# Patient Record
Sex: MALE | Race: OTHER | HISPANIC OR LATINO | Employment: FULL TIME | URBAN - METROPOLITAN AREA
[De-identification: names, ages, dates, MRNs, and addresses within clinical notes are randomized per-mention and may not be internally consistent; named-entity substitution may affect disease eponyms.]

---

## 2018-06-28 ENCOUNTER — HOSPITAL ENCOUNTER (EMERGENCY)
Facility: HOSPITAL | Age: 23
Discharge: HOME | End: 2018-06-29
Attending: EMERGENCY MEDICINE
Payer: COMMERCIAL

## 2018-06-28 DIAGNOSIS — R10.9 ABDOMINAL PAIN: ICD-10-CM

## 2018-06-28 DIAGNOSIS — R05.9 COUGH: Primary | ICD-10-CM

## 2018-06-29 ENCOUNTER — APPOINTMENT (EMERGENCY)
Dept: RADIOLOGY | Facility: HOSPITAL | Age: 23
End: 2018-06-29
Payer: COMMERCIAL

## 2018-06-29 VITALS
HEART RATE: 61 BPM | SYSTOLIC BLOOD PRESSURE: 127 MMHG | TEMPERATURE: 97.1 F | DIASTOLIC BLOOD PRESSURE: 68 MMHG | OXYGEN SATURATION: 97 % | RESPIRATION RATE: 20 BRPM

## 2018-06-29 PROCEDURE — 96372 THER/PROPH/DIAG INJ SC/IM: CPT

## 2018-06-29 PROCEDURE — 71046 X-RAY EXAM CHEST 2 VIEWS: CPT

## 2018-06-29 PROCEDURE — 99283 EMERGENCY DEPT VISIT LOW MDM: CPT

## 2018-06-29 RX ORDER — KETOROLAC TROMETHAMINE 30 MG/ML
30 INJECTION, SOLUTION INTRAMUSCULAR; INTRAVENOUS ONCE
Status: COMPLETED | OUTPATIENT
Start: 2018-06-29 | End: 2018-06-29

## 2018-06-29 RX ORDER — KETOROLAC TROMETHAMINE 30 MG/ML
INJECTION, SOLUTION INTRAMUSCULAR; INTRAVENOUS
Status: COMPLETED
Start: 2018-06-29 | End: 2018-06-29

## 2018-06-29 RX ORDER — NAPROXEN 375 MG/1
375 TABLET ORAL 2 TIMES DAILY WITH MEALS
Qty: 20 TABLET | Refills: 0 | Status: SHIPPED | OUTPATIENT
Start: 2018-06-29

## 2018-06-29 RX ADMIN — KETOROLAC TROMETHAMINE 30 MG: 30 INJECTION, SOLUTION INTRAMUSCULAR; INTRAVENOUS at 01:08

## 2018-06-29 NOTE — ED NOTES
Pt  Reports that his pain is better - asking for work note - as he was supposed to go to work Wilmer de jesus, RN  06/29/18 4142

## 2018-06-29 NOTE — ED PROVIDER NOTES
Pt Name: Renny Pope  MRN: [id number]  [place] 1995  Age/Sex: 23 y o  male  Date of evaluation: 6/28/2018  PCP: Idalia Whaley MD    [address] [place] [address]    Chief Complaint   Patient presents with   Veleta Jarocho Like Symptoms     pt states that he started on sunday with congestion and cough  pt took OTC meds this morning  pt states that it is a dry cough  pt denies fevers but states he has chills  pt denies having a FD  pt states that he is also is having generalized pain  pt states that he also has a sore throat         HPI    23 y o  male presenting with cough congestion, and chills for approximately 5 days  Patient states that he is also having pain throughout his entire body, and entire abdomen back  This pain is mild, unchanged with over-the-counter cold remedies that he has been taking  Patient states he still able tolerate food and drink  He notes these also started developing a mild sore throat today  He denies trauma, sick contacts, recent travel  HPI      Past Medical and Surgical History    History reviewed  No pertinent past medical history  Past Surgical History:   Procedure Laterality Date    APPENDECTOMY         History reviewed  No pertinent family history  Social History   Substance Use Topics    Smoking status: Current Every Day Smoker     Packs/day: 1 00     Types: Cigarettes    Smokeless tobacco: Current User    Alcohol use Yes      Comment: social           Allergies    No Known Allergies    Home Medications    Prior to Admission medications    Not on File           Review of Systems    Review of Systems   Constitutional: Positive for chills and fatigue  Negative for appetite change and diaphoresis  HENT: Positive for sore throat  Negative for drooling, facial swelling, trouble swallowing and voice change  Respiratory: Positive for cough  Negative for apnea, shortness of breath and wheezing  Cardiovascular: Negative for chest pain and leg swelling  Gastrointestinal: Negative for abdominal distention, abdominal pain, diarrhea, nausea and vomiting  Genitourinary: Negative for dysuria and urgency  Musculoskeletal: Positive for back pain and myalgias  Negative for arthralgias, gait problem and neck pain  Skin: Negative for color change, rash and wound  Neurological: Negative for seizures, speech difficulty, weakness and headaches  Psychiatric/Behavioral: Negative for agitation, behavioral problems and dysphoric mood  The patient is not nervous/anxious  All other systems reviewed and negative  Physical Exam      ED Triage Vitals [06/28/18 2343]   Temperature Pulse Respirations Blood Pressure SpO2   (!) 97 1 °F (36 2 °C) 98 18 132/77 99 %      Temp Source Heart Rate Source Patient Position - Orthostatic VS BP Location FiO2 (%)   Temporal Monitor Sitting Left arm --      Pain Score       No Pain               Physical Exam   Constitutional: He is oriented to person, place, and time  He appears well-developed and well-nourished  HENT:   Head: Normocephalic and atraumatic  Posterior oropharynx erythematous but no swelling, airway widely patent and handling secretions  Eyes: Conjunctivae and EOM are normal  Pupils are equal, round, and reactive to light  Neck: Normal range of motion  Neck supple  No tracheal deviation present  Cardiovascular: Normal rate, regular rhythm, normal heart sounds and intact distal pulses  No murmur heard  Pulmonary/Chest: Effort normal and breath sounds normal  No stridor  No respiratory distress  He has no wheezes  He has no rales  Abdominal: Soft  He exhibits no distension  There is tenderness  There is no rebound and no guarding  Diffuse mild ttp  Musculoskeletal: Normal range of motion  He exhibits no edema or deformity  Neurological: He is alert and oriented to person, place, and time  Skin: Skin is warm and dry  No rash noted  Psychiatric: He has a normal mood and affect   His behavior is normal  Judgment and thought content normal             Diagnostic Results      Labs:    No results found for this or any previous visit  All labs reviewed and utilized in the medical decision making process    Radiology:    XR chest 2 views   ED Interpretation   No acute cardiopulmonary      Final Result      No acute cardiopulmonary disease  Workstation performed: [id number]             All radiology studies independently viewed by me and interpreted by the radiologist     Procedure    Procedures    CritCare Time      ED Course of Care and Re-Assessments    Symptoms improved significantly with ketorolac  Medications   ketorolac (TORADOL) injection 30 mg (30 mg Intramuscular Given 6/29/18 0108)           FINAL IMPRESSION    Final diagnoses:   Cough   Abdominal pain         DISPOSITION/PLAN    53-year-old male with history and symptoms above  Vital signs reassuring, examination also reassuring  Chest x-ray sent returned reassuring with no acute disease seen  Do not suspect bacterial pneumonia, sepsis, pneumothorax, ACS, other life threat at this time  Symptoms much improved with Toradol  Discharged strict return precautions, follow up primary care doctor  Time reflects when diagnosis was documented in both MDM as applicable and the Disposition within this note     Time User Action Codes Description Comment    6/29/2018  1:14 AM Florance Mirzas T Add [R05] Cough     6/29/2018  1:14 AM Minus Boys Add [R10 9] Abdominal pain       ED Disposition     ED Disposition Condition Comment    Discharge  [address] discharge to home/self care      Condition at discharge: Good        Follow-up Information     Follow up With Specialties Details Why Contact Info    Anthony Lugo MD Internal Medicine Call in 1 day As needed to schedule an appointment to recheck your symptoms [address],  [address] [phone]              PATIENT REFERRED TO:    Anthony Lugo MD  [address] [place] [id number]  [phone]    Call in 1 day  As needed to schedule an appointment to recheck your symptoms      DISCHARGE MEDICATIONS:    Discharge Medication List as of 6/29/2018  1:16 AM      START taking these medications    Details   naproxen (NAPROSYN) 375 mg tablet Take 1 tablet (375 mg total) by mouth 2 (two) times a day with meals, Starting Fri 6/29/2018, Print             No discharge procedures on file           MD Bard Mg Leggett MD  06/29/18 6867

## 2018-06-29 NOTE — DISCHARGE INSTRUCTIONS
Dolor abdominal, cuidados ambulatorios   INFORMACIÓN GENERAL:   El dolor abdominal  puede ser sordo, molesto o [place]  Puede sentir dolor en kt meño del abdomen o en todo el abdomen  El dolor puede deberse a ciertos estados ori estreñimiento, sensibilidad o intoxicación alimentaria, infección o kt obstrucción  Asimismo, el dolor abdominal puede deberse a kt hernia, apendicitis o úlcera  La causa del dolor abdominal puede ser desconocida  Busque cuidados inmediatos para los siguientes síntomas:   · [place] dolor de pecho o falta de aliento    · Dolor pulsátil en el abdomen superior o en la parte inferior de la espalda que de repente es donna    · Dolor que se localiza en el abdomen inferior derecho y empeora con el movimiento    · Fiebre por encima de 100 4°F (38°C) o escalofríos janell    · Vómito de todo lo que usted come y allen    · Dolor que no mejora o más summer empeora davie las siguientes 8 a 12 horas    · Marcelo en el vómito o heces que se azeb negras y alquitranadas    · La piel o el stuart de los ojos se vuelven amarillentos    · Grandes cantidades de sangrado vaginal que no es pablo periodo menstrual  El tratamiento para el dolor abdominal  puede llegar a incluir medicamentos para calmar pablo estómago, prevenir el vómito o disminuir el dolor  Programe kt laura con pablo proveedor de [employer] se le haya indicado: Anote michelle preguntas para que se acuerde de hacerlas davie michelle visitas  ACUERDOS SOBRE PABLO CUIDADO:   Usted tiene el derecho de participar en la planificación de pablo cuidado  Aprenda todo lo que pueda sobre pablo condición y ori darle tratamiento  Discuta con michelle médicos michelle opciones de tratamiento para juntos decidir el cuidado que usted quiere recibir  Usted siempre tiene el derecho a rechazar pablo tratamiento  Esta información es sólo para uso en educación  Pablo intención no es darle un consejo médico sobre enfermedades o tratamientos   Colsulte con pablo [place] farmacéutico antes de seguir cualquier régimen médico para saber si es seguro y efectivo para usted  © 2014 [address] is for End User's use only and may not be sold, redistributed or otherwise used for commercial purposes  All illustrations and images included in CareNotes® are the copyrighted property of A D A M , Inc  or Maico Vann  Tos aguda   LO QUE NECESITA SABER:   Iraida Stagers tos aguda puede durar hasta 3 semanas  Las causas comunes de kt tos aguda incluyen un resfriado, alergias o kt infección pulmonar  INSTRUCCIONES SOBRE EL JOSE HOSPITALARIA:   Regrese a la benji de emergencias si:   · Tiene dificultad para respirar o le falta el aire  · Usted tose lachelle o nota lachelle en dobbs mucosidad  · Se desmaya, se siente débil o mareado  · Le duele el pecho cuando respira hondo o tose  · Tiene respiración silbante  Pregúntele a dobbs Heather Punch vitaminas y minerales son adecuados para usted  · Usted tiene fiebre  · La tos dura más de 4 semanas  · Judy síntomas no mejoran con el tratamiento  · Usted tiene preguntas o inquietudes acerca de dobbs condición o cuidado  Medicamentos:   · Medicamentos,  para detener la tos, disminuir la inflamación de las vías respiratorias o ayudar a abrirlas  Los medicamentos también pueden despejar las vías respiratorias  Pregunte a dobbs médico qué medicamentos de venta patricia puede maribel  Si tiene kt infección causada por bacterias, es posible que necesite antibióticos  · [place] judy medicamentos ori se le haya indicado  Consulte con dobbs médico si usted anusha que dobbs medicamento no le está ayudando o si presenta efectos secundarios  Infórmele si es alérgico a cualquier medicamento  Mantenga kt lista actualizada de los [place], las vitaminas y los productos herbales que allen  Incluya los siguientes datos de los medicamentos: cantidad, frecuencia y motivo de administración   Traiga con usted la lista o los envases de la píldoras a judy citas de seguimiento  Lleve la lista de los medicamentos con usted en jose de kt emergencia  El manejo de dobbs síntomas:   · No fume y permanezca lejos de otras personas que fuman  La nicotina y otros químicos contenidos en los cigarrillos y puros pueden causar daño pulmonar y empeorar dobbs tos  Pida información a dobbs médico si usted actualmente fuma y necesita ayuda para dejar de fumar  Los cigarrillos electrónicos o tabaco sin humo todavía contienen nicotina  Consulte con dobbs médico antes de [employer]  · Stella líquidos adicionales ori se le indique  Los líquidos le ayudarán a aflojar y disolver la mucosidad para que la pueda expectorar  Los líquidos ayudarán a evitar la deshidratación  Los mejores líquidos para maribel son el agua, el jugo y el caldo  No tome líquidos que contienen cafeína  La cafeína puede aumentar el riesgo de deshidratación  Pregúntele a dobbs médico cuál es la cantidad de líquido que necesita ingerir a diario  · Repose según le indicaron  No realice actividades que empeoren la tos, ori el ejercicio físico      · Use un humidificador o vaporizador  Use un humidificador de devang frío o un vaporizador para elevar la humedad en dobbs casa  [employer] podría ayudarle a respirar más fácilmente y al mismo tiempo disminuir la tos  · Ingiera de 2 a 5 ml de [employer] al día  La miel puede ayudar a diluir los mocos y [employer] tos  · Utilice gotas o pastillas para la tos  Estas pueden ayudar a disminuir la irritación de la garganta y la tos  Acuda a michelle consultas de control con dobbs médico según le indicaron  Anote michelle preguntas para que se acuerde de hacerlas davie michelle visitas  © 2017 [address] is for End User's use only and may not be sold, redistributed or otherwise used for commercial purposes  All illustrations and images included in CareNotes® are the copyrighted property of A D A M , Inc  or Maico Vann    Esta información es sólo para uso en educación  Dobbs intención no es darle un consejo médico sobre enfermedades o tratamientos  Colsulte con dobbs Stormy Binder farmacéutico antes de seguir cualquier régimen médico para saber si es seguro y efectivo para usted